# Patient Record
Sex: FEMALE | Race: WHITE | NOT HISPANIC OR LATINO | Employment: UNEMPLOYED | URBAN - METROPOLITAN AREA
[De-identification: names, ages, dates, MRNs, and addresses within clinical notes are randomized per-mention and may not be internally consistent; named-entity substitution may affect disease eponyms.]

---

## 2024-02-28 ENCOUNTER — HOSPITAL ENCOUNTER (EMERGENCY)
Facility: MEDICAL CENTER | Age: 34
End: 2024-02-28
Attending: EMERGENCY MEDICINE
Payer: COMMERCIAL

## 2024-02-28 ENCOUNTER — APPOINTMENT (OUTPATIENT)
Dept: RADIOLOGY | Facility: MEDICAL CENTER | Age: 34
End: 2024-02-28
Attending: EMERGENCY MEDICINE
Payer: COMMERCIAL

## 2024-02-28 VITALS
TEMPERATURE: 98.3 F | HEIGHT: 61 IN | SYSTOLIC BLOOD PRESSURE: 121 MMHG | DIASTOLIC BLOOD PRESSURE: 71 MMHG | WEIGHT: 119.93 LBS | OXYGEN SATURATION: 99 % | HEART RATE: 81 BPM | RESPIRATION RATE: 20 BRPM | BODY MASS INDEX: 22.64 KG/M2

## 2024-02-28 DIAGNOSIS — S93.402A SPRAIN OF LEFT ANKLE, UNSPECIFIED LIGAMENT, INITIAL ENCOUNTER: ICD-10-CM

## 2024-02-28 PROCEDURE — 73610 X-RAY EXAM OF ANKLE: CPT | Mod: LT

## 2024-02-28 PROCEDURE — 99284 EMERGENCY DEPT VISIT MOD MDM: CPT

## 2024-02-29 NOTE — DISCHARGE INSTRUCTIONS
For ankle immobilizer.  Use crutches nonweightbearing for at least a week or until cleared by orthopedics.  Return for more pain numbness ting weakness or other concerns.  When she can walk without any limp at all you can get rid of the crutches.  Follow-up with orthopedics within a week for repeat x-ray and further evaluation.

## 2024-02-29 NOTE — ED NOTES
Tall walking boot applied and discharge home.    Discharge home with instructions and follow up care reviewed and given to patient with verbal understanding.    Family member with patient.  Transferred out of the ED via wheel chair by a family member

## 2024-02-29 NOTE — ED TRIAGE NOTES
Chief Complaint   Patient presents with    Ankle Pain     L ankle swelling and pain s/p fall while bouldering at gym. Reports fall was from approx 6ft and inverted L ankle.      Physical Exam  Pulmonary:      Effort: Pulmonary effort is normal.   Skin:     General: Skin is warm and dry.   Neurological:      Mental Status: She is alert.

## 2024-02-29 NOTE — ED PROVIDER NOTES
"ED Provider Note    CHIEF COMPLAINT  Chief Complaint   Patient presents with    Ankle Pain     L ankle swelling and pain s/p fall while bouldering at gym. Reports fall was from approx 6ft and inverted L ankle.          HPI/ROS      Janell Saldana is a 34 y.o. female who presents to the emergency department complaining of left ankle pain.  The patient was bouldering and fell from height about 6 feet landing on her left ankle and rolled her ankle.  She had pain swelling and ecchymosis develop in the lateral aspect of the left ankle is a difficult time walking since that time.  Did not hit her head or get knocked out.  No other injuries or complaints.  No numbness or tingling distally.    PAST MEDICAL HISTORY   has a past medical history of Patient denies medical problems.    SURGICAL HISTORY  patient denies any surgical history    FAMILY HISTORY  History reviewed. No pertinent family history.    SOCIAL HISTORY  Social History     Tobacco Use    Smoking status: Never    Smokeless tobacco: Never   Substance and Sexual Activity    Alcohol use: Not Currently    Drug use: Not Currently    Sexual activity: Not on file       CURRENT MEDICATIONS  Home Medications       Reviewed by Patricia Lopes R.N. (Registered Nurse) on 02/28/24 at 1847  Med List Status: Not Addressed     Medication Last Dose Status        Patient Neymar Taking any Medications                           ALLERGIES  No Known Allergies    PHYSICAL EXAM  VITAL SIGNS: /77   Pulse 91   Temp 37 °C (98.6 °F) (Temporal)   Resp 20   Ht 1.549 m (5' 1\")   Wt 54.4 kg (119 lb 14.9 oz)   LMP 02/26/2024 (Approximate)   SpO2 98%   BMI 22.66 kg/m²      Musculoskeletal exam: Left leg no proximal fibular tenderness.  No proximal tibial tenderness.  Very minimal tenderness over the medial malleolus just below the medial malleolus.  Significant ecchymosis swelling and tenderness around the lateral malleolus.  Good pulse normal neurovasc exam.  Very minimal distal " fibular tenderness.  She is able to dorsiflex and plantarflex her ankle.  Not checked for lateral stability.  No gross instability.      DIAGNOSTIC STUDIES / PROCEDURES      RADIOLOGY  I have independently interpreted the diagnostic imaging associated with this visit and am waiting the final reading from the radiologist.     Radiologist interpretation:   DX-ANKLE 3+ VIEWS LEFT   Final Result      No acute osseous abnormality.            COURSE & MEDICAL DECISION MAKING    ED Observation Status? No; Patient does not meet criteria for ED Observation.     INITIAL ASSESSMENT, COURSE AND PLAN  Care Narrative:   44-year-old female presents with to the emergency department the right ankle pain after a fall.    Differential diagnosis includes but is not limited to fracture, dislocation or sprain.    X-rays read by radiology as no fracture or dislocation.  Patient is reexamined she has good plantarflexion and dorsiflexion.  No gross instability.  She has a high-grade sprain.    Will put her in a tall walking boot and give her crutches neurovascular turn precautions.  Plans rest ice elevation orthopedic follow-up.  Orthopedic referral is placed.  She will be asked return to the ED for pain numbness ting weakness or other concerns.  Questions were answered agreeable with the plan.  Discharged in good condition.      Patient placed in a tall walking boot.  Walking boot was appropriate.  Of order crutches the patient refused them stating she has her own crutches and does not want to spend the money.  She will follow-up with orthopedics.  She will return for more pain or other concerns.  Orthopedic referral was placed.  Questions were answered is agreeable to plan.    Ajay Cartwright M.D.  [address] [facility] [id number] [phone]    Schedule an appointment as soon as possible for a visit in 1 week              DISPOSITION AND DISCUSSIONS      FINAL DIAGNOSIS  1. Sprain of left ankle, unspecified ligament, initial  encounter           Electronically signed by: Martin Torres M.D., 2/28/2024 10:23 PM